# Patient Record
Sex: FEMALE | Race: WHITE | NOT HISPANIC OR LATINO | Employment: PART TIME | URBAN - METROPOLITAN AREA
[De-identification: names, ages, dates, MRNs, and addresses within clinical notes are randomized per-mention and may not be internally consistent; named-entity substitution may affect disease eponyms.]

---

## 2022-10-20 ENCOUNTER — OFFICE VISIT (OUTPATIENT)
Dept: OBGYN CLINIC | Facility: CLINIC | Age: 22
End: 2022-10-20

## 2022-10-20 VITALS
WEIGHT: 151 LBS | HEIGHT: 63 IN | SYSTOLIC BLOOD PRESSURE: 112 MMHG | DIASTOLIC BLOOD PRESSURE: 78 MMHG | BODY MASS INDEX: 26.75 KG/M2

## 2022-10-20 DIAGNOSIS — Z01.419 GYNECOLOGIC EXAM NORMAL: Primary | ICD-10-CM

## 2022-10-20 DIAGNOSIS — N94.6 DYSMENORRHEA: ICD-10-CM

## 2022-10-20 DIAGNOSIS — Z11.3 SCREENING EXAMINATION FOR VENEREAL DISEASE: ICD-10-CM

## 2022-10-20 RX ORDER — NORETHINDRONE ACETATE AND ETHINYL ESTRADIOL 1MG-20(21)
1 KIT ORAL DAILY
Qty: 28 TABLET | Refills: 2 | Status: SHIPPED | OUTPATIENT
Start: 2022-10-20

## 2022-10-20 NOTE — PROGRESS NOTES
Assessment/Plan   Problem List Items Addressed This Visit        Other    Gynecologic exam normal - Primary     Pap guidelines reviewed  Will plan to start pap smears at age 25  STD cultures done today  Reviewed birth control options including OCP, NuvaRing, Patch, Depo, Nexplanon  Patient decided on OCP  Reviewed when to start, what to do if misses pill  Recommend using condoms for the 1st month on the pill, if misses more than 2 pills in the pack, if on antibiotics and for STD prevention  Reviewed common side effects of the pill including nausea, vomiting, breast pain, bloating, fatigue, mood swings, weight gain, and increased acne  Reassured side effects typically diminish in the first month or two on the pill  Reviewed clotting risk and signs and symptoms of pulmonary embolism, DVT, myocardial infarction, stroke  Will plan to trial Junel Fe 1/20  Return to office in 3 months for pill check  Other Visit Diagnoses     Dysmenorrhea        Relevant Medications    norethindrone-ethinyl estradiol (Junel FE 1/20) 1-20 MG-MCG per tablet    Screening examination for venereal disease        Relevant Orders    Chlamydia/GC amplified DNA by PCR          Subjective:     Patient ID: Keesha Aguilera is a 22 y o  y o  female  HPI  23 yo seen for annual exam    Menses monthly, last 4 days and then 2 days of spotting  Cramping mostly in lower back better with heating pad and advil  Using tampon during the day changing every 4 hours or so  Sexually active, monogamous x 2 years  Has had previous partners, has never had STD testing  Denies bowel or bladder issues  Would like to discuss birth control options  Last pap: N/A  The following portions of the patient's history were reviewed and updated as appropriate:   She  has no past medical history on file    She   Patient Active Problem List    Diagnosis Date Noted   • Gynecologic exam normal 10/20/2022   • Cleft palate, unspecified 08/10/2011   • Simple chronic serous otitis media 2006     She  has no past surgical history on file  Her family history includes Breast cancer in her mother; Heart disease in her father; Hypertension in her father; Tabby Dieter / [place] in her mother  She  reports that she has never smoked  She has never used smokeless tobacco  She reports previous alcohol use  She reports that she does not use drugs  Current Outpatient Medications   Medication Sig Dispense Refill   • norethindrone-ethinyl estradiol (Junel ) 1-20 MG-MCG per tablet Take 1 tablet by mouth daily 28 tablet 2     No current facility-administered medications for this visit  She has No Known Allergies       Menstrual History:  OB History        0    Para   0    Term   0       0    AB   0    Living   0       SAB   0    IAB   0    Ectopic   0    Multiple   0    Live Births   0                  Patient's last menstrual period was 2022 (approximate)  Review of Systems   Constitutional: Negative for fatigue, fever and unexpected weight change  HENT: Negative for dental problem and sinus pressure  Eyes: Negative for visual disturbance  Respiratory: Negative for cough, shortness of breath and wheezing  Cardiovascular: Negative for chest pain  Gastrointestinal: Negative for abdominal pain, blood in stool, constipation, diarrhea, nausea and vomiting  Endocrine: Negative for polydipsia  Genitourinary: Negative for difficulty urinating, dyspareunia, dysuria, frequency, hematuria, pelvic pain and urgency  Musculoskeletal: Negative for arthralgias and back pain  Neurological: Negative for dizziness, seizures, light-headedness and headaches  Psychiatric/Behavioral: Negative for suicidal ideas  The patient is not nervous/anxious          Objective:  Vitals:    10/20/22 1327   BP: 112/78   BP Location: Right arm   Patient Position: Sitting   Cuff Size: Standard   Weight: 68 5 kg (151 lb)   Height: 5' 3" (1 6 m)      Physical Exam  Constitutional:       Appearance: Normal appearance  She is well-developed  Genitourinary:      Vulva and bladder normal       No lesions in the vagina  Right Labia: No rash, tenderness, lesions or skin changes  Left Labia: No tenderness, lesions, skin changes or rash  No labial fusion noted  No inguinal adenopathy present in the right or left side  No vaginal discharge, erythema, tenderness or bleeding  Right Adnexa: not tender, not full and no mass present  Left Adnexa: not tender, not full and no mass present  No cervical motion tenderness, discharge or lesion  Uterus is not enlarged, tender or irregular  No uterine mass detected  No urethral prolapse, tenderness or mass present  Bladder is not tender  Breasts: Breasts are symmetrical       Right: No swelling, bleeding, inverted nipple, mass, nipple discharge, skin change, tenderness, axillary adenopathy or supraclavicular adenopathy  Left: No swelling, bleeding, inverted nipple, mass, nipple discharge, skin change, tenderness, axillary adenopathy or supraclavicular adenopathy  HENT:      Head: Normocephalic and atraumatic  Neck:      Thyroid: No thyromegaly  Cardiovascular:      Rate and Rhythm: Normal rate and regular rhythm  Heart sounds: Normal heart sounds  No murmur heard  No friction rub  No gallop  Pulmonary:      Effort: Pulmonary effort is normal  No respiratory distress  Breath sounds: Normal breath sounds  No wheezing  Abdominal:      General: There is no distension  Palpations: Abdomen is soft  There is no mass  Tenderness: There is no abdominal tenderness  There is no guarding or rebound  Hernia: No hernia is present  Lymphadenopathy:      Cervical: No cervical adenopathy  Upper Body:      Right upper body: No supraclavicular, axillary or pectoral adenopathy        Left upper body: No supraclavicular, axillary or pectoral adenopathy  Lower Body: No right inguinal adenopathy  No left inguinal adenopathy  Neurological:      Mental Status: She is alert and oriented to person, place, and time  Skin:     General: Skin is warm and dry     Psychiatric:         Behavior: Behavior normal

## 2022-10-20 NOTE — ASSESSMENT & PLAN NOTE
Pap guidelines reviewed  Will plan to start pap smears at age 25  STD cultures done today  Reviewed birth control options including OCP, NuvaRing, Patch, Depo, Nexplanon  Patient decided on OCP  Reviewed when to start, what to do if misses pill  Recommend using condoms for the 1st month on the pill, if misses more than 2 pills in the pack, if on antibiotics and for STD prevention  Reviewed common side effects of the pill including nausea, vomiting, breast pain, bloating, fatigue, mood swings, weight gain, and increased acne  Reassured side effects typically diminish in the first month or two on the pill  Reviewed clotting risk and signs and symptoms of pulmonary embolism, DVT, myocardial infarction, stroke  Will plan to trial Junel Fe 1/20  Return to office in 3 months for pill check

## 2022-10-22 LAB
C TRACH RRNA SPEC QL NAA+PROBE: NEGATIVE
N GONORRHOEA RRNA SPEC QL NAA+PROBE: NEGATIVE

## 2022-11-09 DIAGNOSIS — N94.6 DYSMENORRHEA: ICD-10-CM

## 2022-11-09 RX ORDER — NORETHINDRONE ACETATE AND ETHINYL ESTRADIOL AND FERROUS FUMARATE 1MG-20(21)
KIT ORAL
Qty: 84 TABLET | Refills: 1 | Status: SHIPPED | OUTPATIENT
Start: 2022-11-09

## 2022-11-18 ENCOUNTER — TELEPHONE (OUTPATIENT)
Dept: OBGYN CLINIC | Facility: CLINIC | Age: 22
End: 2022-11-18

## 2022-11-18 NOTE — TELEPHONE ENCOUNTER
Patient called stating that she was notified by her local [facility] in [address] [place] [address] that her insurance no longer cover that pharmacy and she must go through a home delivery pharmacy  She was not sure how to proceed with the process and asking for help  Advised her to first contact her insurance to determine what home delivery pharmacy they cover so therefore we can know to update her chart and can sent the additional refills to the pharmacy  She states she only have 2 pills left, informed while on the phone with her insurance, she can ask them if they can override one time refill at her local pharmacy until the rest of her script delivers, or she may pay out of pocket  She voiced appreciation

## 2022-12-19 PROBLEM — Z01.419 GYNECOLOGIC EXAM NORMAL: Status: RESOLVED | Noted: 2022-10-20 | Resolved: 2022-12-19

## 2023-01-05 ENCOUNTER — OFFICE VISIT (OUTPATIENT)
Dept: OBGYN CLINIC | Facility: CLINIC | Age: 23
End: 2023-01-05

## 2023-01-05 VITALS
DIASTOLIC BLOOD PRESSURE: 70 MMHG | BODY MASS INDEX: 27.29 KG/M2 | HEIGHT: 63 IN | SYSTOLIC BLOOD PRESSURE: 115 MMHG | WEIGHT: 154 LBS

## 2023-01-05 DIAGNOSIS — R63.5 WEIGHT GAIN: ICD-10-CM

## 2023-01-05 DIAGNOSIS — N92.6 IRREGULAR MENSES: Primary | ICD-10-CM

## 2023-01-05 DIAGNOSIS — R35.0 URINARY FREQUENCY: ICD-10-CM

## 2023-01-05 DIAGNOSIS — R63.1 EXCESSIVE THIRST: ICD-10-CM

## 2023-01-05 RX ORDER — NORETHINDRONE ACETATE AND ETHINYL ESTRADIOL 1.5-30(21)
1 KIT ORAL DAILY
Qty: 90 TABLET | Refills: 1 | Status: SHIPPED | OUTPATIENT
Start: 2023-01-05

## 2023-01-05 NOTE — PROGRESS NOTES
Assessment/Plan:    Excessive thirst  Reviewed excessive thirst and urinary frequency  Seems to be in the last 3 months since starting OCP  Reviewed not a typical side effect of birth control  Recommend labs to further evaluate  Script given  Aware to follow up with PCP as well  Weight gain  Reviewed persistent weight gain without dietary or exercise modifications  Will plan labs to further evaluate  If normal recommend follow up with PCP to evaluate further  Irregular menses  Reviewed irregular menses with patient on OCP  Reviewed missing or being late on pills can cause breakthrough bleeding  Patient reports has been good about taking the last two months and still getting breakthrough  Offered to switch to a longer term method of birth control vs changing to a higher dose birth control pill to help decrease breakthrough bleeding  Patient would like to increase dose  Script for [facility] [facility] 1 5/30 sent to pharmacy  Will return to office in 3 months for pill check  Problem List Items Addressed This Visit        Other    Irregular menses - Primary     Reviewed irregular menses with patient on OCP  Reviewed missing or being late on pills can cause breakthrough bleeding  Patient reports has been good about taking the last two months and still getting breakthrough  Offered to switch to a longer term method of birth control vs changing to a higher dose birth control pill to help decrease breakthrough bleeding  Patient would like to increase dose  Script for [place] [facility] 1 5/30 sent to pharmacy  Will return to office in 3 months for pill check  Relevant Medications    norethindrone-ethinyl estradiol-iron (Junel FE 1 5/30) 1 5-30 MG-MCG tablet    Other Relevant Orders    CBC and differential    Weight gain     Reviewed persistent weight gain without dietary or exercise modifications  Will plan labs to further evaluate  If normal recommend follow up with PCP to evaluate further            Relevant Orders    Comprehensive metabolic panel    HEMOGLOBIN A1C W/ EAG ESTIMATION    TSH, 3rd generation    T4, free    Excessive thirst     Reviewed excessive thirst and urinary frequency  Seems to be in the last 3 months since starting OCP  Reviewed not a typical side effect of birth control  Recommend labs to further evaluate  Script given  Aware to follow up with PCP as well  Relevant Orders    Comprehensive metabolic panel    HEMOGLOBIN A1C W/ EAG ESTIMATION   Other Visit Diagnoses     Urinary frequency        Relevant Orders    Comprehensive metabolic panel    UA/M w/rflx Culture, Routine            Subjective:      Patient ID: Ramirez Morrison is a 25 y o  female  HPI  26 yo seen for pill check  Was started on Junel Fe 1/20 3 months ago  Reports 1st month had breakthrough spotting  2nd month had full menses in the middle and then got menses again during placebo week  Reports some spotting this pack as well  Reports forgotten pill only 1-2 x's over the last 3 months  Reports increased thirst, and increased frequency of urination  Had a UTI for the first time ever in the first month on the pill  Denies any current hesitancy, dysuria, urgency, hematuria, fever, chills, flank pain  Reports noticed since the Spring and increase in weight about 20+ lbs with no changes in diet or exercise  Since starting the pill has gained about 3 lbs per record  Patient is tearful, frustrated with weight gain  The following portions of the patient's history were reviewed and updated as appropriate: She  has no past medical history on file  She   Patient Active Problem List    Diagnosis Date Noted   • Irregular menses 01/05/2023   • Weight gain 01/05/2023   • Excessive thirst 01/05/2023   • Cleft palate, unspecified 08/10/2011   • Simple chronic serous otitis media 08/23/2006     She  has no past surgical history on file    Her family history includes Breast cancer in her mother; Heart disease in her father; Hypertension in her father; Sonal Reynolds / [place] in her mother  She  reports that she has never smoked  She has never used smokeless tobacco  She reports that she does not currently use alcohol  She reports that she does not use drugs  Current Outpatient Medications   Medication Sig Dispense Refill   • norethindrone-ethinyl estradiol-iron (Junel FE 1 5/30) 1 5-30 MG-MCG tablet Take 1 tablet by mouth daily 90 tablet 1     No current facility-administered medications for this visit  She has No Known Allergies       Review of Systems   Constitutional: Positive for unexpected weight change  Negative for fatigue and fever  HENT: Negative for dental problem and sinus pressure  Eyes: Negative for visual disturbance  Respiratory: Negative for cough, shortness of breath and wheezing  Cardiovascular: Negative for chest pain  Gastrointestinal: Negative for abdominal pain, blood in stool, constipation, diarrhea, nausea and vomiting  Endocrine: Positive for polydipsia  Genitourinary: Positive for frequency and menstrual problem  Negative for difficulty urinating, dyspareunia, dysuria, hematuria, pelvic pain and urgency  Musculoskeletal: Negative for arthralgias and back pain  Neurological: Negative for dizziness, seizures, light-headedness and headaches  Psychiatric/Behavioral: Negative for suicidal ideas  The patient is not nervous/anxious  Objective:      /70   Ht 5' 3" (1 6 m)   Wt 69 9 kg (154 lb)   LMP 12/18/2022 (Approximate)   BMI 27 28 kg/m²          Physical Exam  Constitutional:       Appearance: She is well-developed  Neck:      Thyroid: No thyromegaly  Cardiovascular:      Rate and Rhythm: Normal rate and regular rhythm  Heart sounds: Normal heart sounds  No murmur heard  No friction rub  No gallop  Pulmonary:      Effort: Pulmonary effort is normal  No respiratory distress  Breath sounds: Normal breath sounds  No wheezing or rales     Chest: Chest wall: No tenderness  Abdominal:      General: Abdomen is flat  Bowel sounds are normal  There is no distension  Palpations: Abdomen is soft  There is no mass  Tenderness: There is no abdominal tenderness  There is no guarding or rebound  Hernia: No hernia is present  Skin:     General: Skin is warm and dry  Neurological:      Mental Status: She is alert and oriented to person, place, and time     Psychiatric:         Behavior: Behavior normal

## 2023-01-05 NOTE — ASSESSMENT & PLAN NOTE
Reviewed excessive thirst and urinary frequency  Seems to be in the last 3 months since starting OCP  Reviewed not a typical side effect of birth control  Recommend labs to further evaluate  Script given  Aware to follow up with PCP as well

## 2023-01-05 NOTE — ASSESSMENT & PLAN NOTE
Reviewed persistent weight gain without dietary or exercise modifications  Will plan labs to further evaluate  If normal recommend follow up with PCP to evaluate further

## 2023-01-05 NOTE — ASSESSMENT & PLAN NOTE
Reviewed irregular menses with patient on OCP  Reviewed missing or being late on pills can cause breakthrough bleeding  Patient reports has been good about taking the last two months and still getting breakthrough  Offered to switch to a longer term method of birth control vs changing to a higher dose birth control pill to help decrease breakthrough bleeding  Patient would like to increase dose  Script for [facility] 1 5/30 sent to pharmacy  Will return to office in 3 months for pill check

## 2023-01-07 LAB
ALBUMIN SERPL-MCNC: 4.3 G/DL (ref 3.9–5)
ALBUMIN/GLOB SERPL: 2 {RATIO} (ref 1.2–2.2)
ALP SERPL-CCNC: 47 IU/L (ref 44–121)
ALT SERPL-CCNC: 8 IU/L (ref 0–32)
AST SERPL-CCNC: 13 IU/L (ref 0–40)
BASOPHILS # BLD AUTO: 0 X10E3/UL (ref 0–0.2)
BASOPHILS NFR BLD AUTO: 1 %
BILIRUB SERPL-MCNC: <0.2 MG/DL (ref 0–1.2)
BUN SERPL-MCNC: 12 MG/DL (ref 6–20)
BUN/CREAT SERPL: 18 (ref 9–23)
CALCIUM SERPL-MCNC: 9.5 MG/DL (ref 8.7–10.2)
CHLORIDE SERPL-SCNC: 104 MMOL/L (ref 96–106)
CO2 SERPL-SCNC: 23 MMOL/L (ref 20–29)
CREAT SERPL-MCNC: 0.65 MG/DL (ref 0.57–1)
EGFR: 128 ML/MIN/1.73
EOSINOPHIL # BLD AUTO: 0.2 X10E3/UL (ref 0–0.4)
EOSINOPHIL NFR BLD AUTO: 3 %
ERYTHROCYTE [DISTWIDTH] IN BLOOD BY AUTOMATED COUNT: 13.4 % (ref 11.7–15.4)
EST. AVERAGE GLUCOSE BLD GHB EST-MCNC: 111 MG/DL
GLOBULIN SER-MCNC: 2.2 G/DL (ref 1.5–4.5)
GLUCOSE SERPL-MCNC: 88 MG/DL (ref 70–99)
HBA1C MFR BLD: 5.5 % (ref 4.8–5.6)
HCT VFR BLD AUTO: 37 % (ref 34–46.6)
HGB BLD-MCNC: 12.3 G/DL (ref 11.1–15.9)
IMM GRANULOCYTES # BLD: 0 X10E3/UL (ref 0–0.1)
IMM GRANULOCYTES NFR BLD: 0 %
LYMPHOCYTES # BLD AUTO: 2.1 X10E3/UL (ref 0.7–3.1)
LYMPHOCYTES NFR BLD AUTO: 41 %
MCH RBC QN AUTO: 28 PG (ref 26.6–33)
MCHC RBC AUTO-ENTMCNC: 33.2 G/DL (ref 31.5–35.7)
MCV RBC AUTO: 84 FL (ref 79–97)
MONOCYTES # BLD AUTO: 0.4 X10E3/UL (ref 0.1–0.9)
MONOCYTES NFR BLD AUTO: 7 %
NEUTROPHILS # BLD AUTO: 2.5 X10E3/UL (ref 1.4–7)
NEUTROPHILS NFR BLD AUTO: 48 %
PLATELET # BLD AUTO: 232 X10E3/UL (ref 150–450)
POTASSIUM SERPL-SCNC: 4.1 MMOL/L (ref 3.5–5.2)
PROT SERPL-MCNC: 6.5 G/DL (ref 6–8.5)
RBC # BLD AUTO: 4.39 X10E6/UL (ref 3.77–5.28)
SODIUM SERPL-SCNC: 139 MMOL/L (ref 134–144)
T4 FREE SERPL-MCNC: 1.04 NG/DL (ref 0.82–1.77)
TSH SERPL DL<=0.005 MIU/L-ACNC: 2.85 UIU/ML (ref 0.45–4.5)
WBC # BLD AUTO: 5.2 X10E3/UL (ref 3.4–10.8)

## 2023-12-28 ENCOUNTER — ANNUAL EXAM (OUTPATIENT)
Dept: OBGYN CLINIC | Facility: CLINIC | Age: 23
End: 2023-12-28
Payer: COMMERCIAL

## 2023-12-28 VITALS
HEIGHT: 63 IN | DIASTOLIC BLOOD PRESSURE: 62 MMHG | WEIGHT: 145 LBS | BODY MASS INDEX: 25.69 KG/M2 | SYSTOLIC BLOOD PRESSURE: 104 MMHG

## 2023-12-28 DIAGNOSIS — Z11.3 SCREENING EXAMINATION FOR VENEREAL DISEASE: ICD-10-CM

## 2023-12-28 DIAGNOSIS — Z01.419 GYNECOLOGIC EXAM NORMAL: Primary | ICD-10-CM

## 2023-12-28 PROCEDURE — S0612 ANNUAL GYNECOLOGICAL EXAMINA: HCPCS | Performed by: PHYSICIAN ASSISTANT

## 2023-12-28 NOTE — PROGRESS NOTES
Assessment/Plan   Problem List Items Addressed This Visit          Other    Gynecologic exam normal - Primary     Pap guidelines reviewed. Pap and STD cultures done today.   Reviewed longer term birth control methods. Most interested in Nexplanon. Reviewed insertion, removal, common bleeding patterns and side effects. Reviewed risks of damage to nerves, blood vessels during insertion or removal. Will plan to check insurance for coverage and schedule at earliest available.              Relevant Orders    IGP, CtNg, rfx Aptima HPV ASCU     Other Visit Diagnoses       Screening examination for venereal disease        Relevant Orders    IGP, CtNg, rfx Aptima HPV ASCU            Subjective:     Patient ID: Haritha Guerrier is a 23 y.o. y.o. female.    HPI  22 yo seen for annual exam. Previously on Junel Fe 1.5/30 reports not good at taking would like to discuss longer term options.   Denies bowel or bladder issues.   Not currently sexually active, has been in the past. Would like STD cultures.   Last pap: none on file.   The following portions of the patient's history were reviewed and updated as appropriate: She  has no past medical history on file.  She   Patient Active Problem List    Diagnosis Date Noted    Gynecologic exam normal 12/28/2023    Irregular menses 01/05/2023    Weight gain 01/05/2023    Excessive thirst 01/05/2023    Cleft palate, unspecified 08/10/2011    Simple chronic serous otitis media 08/23/2006     She  has no past surgical history on file.  Her family history includes Breast cancer in her mother; Heart disease in her father; Hypertension in her father; Miscarriages / Stillbirths in her mother.  She  reports that she has never smoked. She has never used smokeless tobacco. She reports that she does not currently use alcohol. She reports that she does not use drugs.  No current outpatient medications on file.     No current facility-administered medications for this visit.     She has No Known  "Allergies..    Menstrual History:  OB History          0    Para   0    Term   0       0    AB   0    Living   0         SAB   0    IAB   0    Ectopic   0    Multiple   0    Live Births   0                  Patient's last menstrual period was 2023.         Review of Systems   Constitutional:  Negative for fatigue, fever and unexpected weight change.   HENT:  Negative for dental problem and sinus pressure.    Eyes:  Negative for visual disturbance.   Respiratory:  Negative for cough, shortness of breath and wheezing.    Cardiovascular:  Negative for chest pain.   Gastrointestinal:  Negative for abdominal pain, blood in stool, constipation, diarrhea, nausea and vomiting.   Endocrine: Negative for polydipsia.   Genitourinary:  Negative for difficulty urinating, dyspareunia, dysuria, frequency, hematuria, pelvic pain and urgency.   Musculoskeletal:  Negative for arthralgias and back pain.   Neurological:  Negative for dizziness, seizures, light-headedness and headaches.   Psychiatric/Behavioral:  Negative for suicidal ideas. The patient is not nervous/anxious.        Objective:  Vitals:    23 1050   BP: 104/62   BP Location: Right arm   Patient Position: Sitting   Cuff Size: Standard   Weight: 65.8 kg (145 lb)   Height: 5' 3\" (1.6 m)      Physical Exam  Constitutional:       Appearance: Normal appearance. She is well-developed.   Genitourinary:      Vulva and bladder normal.      No lesions in the vagina.      Right Labia: No rash, tenderness, lesions or skin changes.     Left Labia: No tenderness, lesions, skin changes or rash.     No labial fusion noted.      No inguinal adenopathy present in the right or left side.     No vaginal discharge, erythema, tenderness or bleeding.        Right Adnexa: not tender, not full and no mass present.     Left Adnexa: not tender, not full and no mass present.     No cervical motion tenderness, discharge or lesion.      Uterus is not enlarged, tender or " irregular.      No uterine mass detected.     No urethral prolapse, tenderness or mass present.      Bladder is not tender.    Breasts:     Breasts are symmetrical.      Right: No swelling, bleeding, inverted nipple, mass, nipple discharge, skin change or tenderness.      Left: No swelling, bleeding, inverted nipple, mass, nipple discharge, skin change or tenderness.   HENT:      Head: Normocephalic and atraumatic.   Neck:      Thyroid: No thyromegaly.   Cardiovascular:      Rate and Rhythm: Normal rate and regular rhythm.      Heart sounds: Normal heart sounds. No murmur heard.     No friction rub. No gallop.   Pulmonary:      Effort: Pulmonary effort is normal. No respiratory distress.      Breath sounds: Normal breath sounds. No wheezing.   Abdominal:      General: There is no distension.      Palpations: Abdomen is soft. There is no mass.      Tenderness: There is no abdominal tenderness. There is no guarding or rebound.      Hernia: No hernia is present.   Lymphadenopathy:      Cervical: No cervical adenopathy.      Upper Body:      Right upper body: No supraclavicular, axillary or pectoral adenopathy.      Left upper body: No supraclavicular, axillary or pectoral adenopathy.      Lower Body: No right inguinal adenopathy. No left inguinal adenopathy.   Neurological:      Mental Status: She is alert and oriented to person, place, and time.   Skin:     General: Skin is warm and dry.   Psychiatric:         Behavior: Behavior normal.

## 2023-12-28 NOTE — ASSESSMENT & PLAN NOTE
Pap guidelines reviewed. Pap and STD cultures done today.   Reviewed longer term birth control methods. Most interested in Nexplanon. Reviewed insertion, removal, common bleeding patterns and side effects. Reviewed risks of damage to nerves, blood vessels during insertion or removal. Will plan to check insurance for coverage and schedule at earliest available.

## 2024-01-02 LAB
C TRACH RRNA CVX QL NAA+PROBE: NEGATIVE
CYTOLOGIST CVX/VAG CYTO: NORMAL
DX ICD CODE: NORMAL
N GONORRHOEA RRNA CVX QL NAA+PROBE: NEGATIVE
OTHER STN SPEC: NORMAL
OTHER STN SPEC: NORMAL
PATH REPORT.FINAL DX SPEC: NORMAL
SL AMB NOTE:: NORMAL
SL AMB SPECIMEN ADEQUACY: NORMAL
SL AMB TEST METHODOLOGY: NORMAL

## 2024-01-03 ENCOUNTER — PROCEDURE VISIT (OUTPATIENT)
Dept: OBGYN CLINIC | Facility: CLINIC | Age: 24
End: 2024-01-03
Payer: COMMERCIAL

## 2024-01-03 VITALS
SYSTOLIC BLOOD PRESSURE: 100 MMHG | HEIGHT: 63 IN | BODY MASS INDEX: 26.22 KG/M2 | DIASTOLIC BLOOD PRESSURE: 64 MMHG | WEIGHT: 148 LBS

## 2024-01-03 DIAGNOSIS — Z32.02 ENCOUNTER FOR PREGNANCY TEST, RESULT NEGATIVE: ICD-10-CM

## 2024-01-03 DIAGNOSIS — Z30.017 NEXPLANON INSERTION: Primary | ICD-10-CM

## 2024-01-03 LAB — SL AMB POCT URINE HCG: NEGATIVE

## 2024-01-03 PROCEDURE — 81025 URINE PREGNANCY TEST: CPT | Performed by: PHYSICIAN ASSISTANT

## 2024-01-03 PROCEDURE — 11981 INSERTION DRUG DLVR IMPLANT: CPT | Performed by: PHYSICIAN ASSISTANT

## 2024-01-03 NOTE — ASSESSMENT & PLAN NOTE
Reviewed insertion, removal, common bleeding patterns and side effects. Reviewed risks of damage to nerves, blood vessels during insertion or removal.   Nexplanon inserted without difficulty. Patient tolerated procedure well.   Avoiding lifting with left arm or getting wet for 24 hours. Leave bandage on for 24 hours and steri-strips for 1 week.   Call office if having any issues.

## 2024-01-03 NOTE — PROGRESS NOTES
Assessment/Plan:    Nexplanon insertion  Reviewed insertion, removal, common bleeding patterns and side effects. Reviewed risks of damage to nerves, blood vessels during insertion or removal.   Nexplanon inserted without difficulty. Patient tolerated procedure well.   Avoiding lifting with left arm or getting wet for 24 hours. Leave bandage on for 24 hours and steri-strips for 1 week.   Call office if having any issues.           Problem List Items Addressed This Visit          Other    Nexplanon insertion - Primary     Reviewed insertion, removal, common bleeding patterns and side effects. Reviewed risks of damage to nerves, blood vessels during insertion or removal.   Nexplanon inserted without difficulty. Patient tolerated procedure well.   Avoiding lifting with left arm or getting wet for 24 hours. Leave bandage on for 24 hours and steri-strips for 1 week.   Call office if having any issues.                Subjective:      Patient ID: Haritha Guerrier is a 23 y.o. female.    HPI  22 yo seen for Nexplanon insertion. LMP: 12/16/2023. Not sexually active. Negative UPT in office today.   The following portions of the patient's history were reviewed and updated as appropriate: She  has no past medical history on file.  She   Patient Active Problem List    Diagnosis Date Noted    Nexplanon insertion 01/03/2024    Gynecologic exam normal 12/28/2023    Irregular menses 01/05/2023    Weight gain 01/05/2023    Excessive thirst 01/05/2023    Cleft palate, unspecified 08/10/2011    Simple chronic serous otitis media 08/23/2006     She  has no past surgical history on file.  Her family history includes Breast cancer in her mother; Heart disease in her father; Hypertension in her father; Miscarriages / Stillbirths in her mother.  She  reports that she has never smoked. She has never used smokeless tobacco. She reports that she does not currently use alcohol. She reports that she does not use drugs.  No current outpatient  "medications on file.     No current facility-administered medications for this visit.     She has No Known Allergies..    Review of Systems   Constitutional:  Negative for fatigue, fever and unexpected weight change.   HENT:  Negative for dental problem and sinus pressure.    Eyes:  Negative for visual disturbance.   Respiratory:  Negative for cough, shortness of breath and wheezing.    Cardiovascular:  Negative for chest pain.   Gastrointestinal:  Negative for abdominal pain, blood in stool, constipation, diarrhea, nausea and vomiting.   Endocrine: Negative for polydipsia.   Genitourinary:  Negative for difficulty urinating, dyspareunia, dysuria, frequency, hematuria, pelvic pain and urgency.   Musculoskeletal:  Negative for arthralgias and back pain.   Neurological:  Negative for dizziness, seizures, light-headedness and headaches.   Psychiatric/Behavioral:  Negative for suicidal ideas. The patient is not nervous/anxious.          Objective:      /64 (BP Location: Left arm, Patient Position: Sitting, Cuff Size: Standard)   Ht 5' 3\" (1.6 m)   Wt 67.1 kg (148 lb)   LMP 12/16/2023 (Approximate)   Breastfeeding No   BMI 26.22 kg/m²          Physical Exam  Constitutional:       Appearance: She is well-developed.   HENT:      Head: Normocephalic and atraumatic.   Pulmonary:      Effort: Pulmonary effort is normal.   Skin:     General: Skin is warm and dry.   Neurological:      Mental Status: She is alert and oriented to person, place, and time.       Universal Protocol:  Consent: Verbal consent obtained. Written consent not obtained.  Risks and benefits: risks, benefits and alternatives were discussed  Consent given by: patient  Time out: Immediately prior to procedure a \"time out\" was called to verify the correct patient, procedure, equipment, support staff and site/side marked as required.  Timeout called at: 1/3/2024 9:26 AM.  Patient understanding: patient states understanding of the procedure being " performed  Patient consent: the patient's understanding of the procedure matches consent given  Procedure consent: procedure consent matches procedure scheduled  Relevant documents: relevant documents present and verified  Test results: test results available and properly labeled  Site marked: the operative site was marked  Required items: required blood products, implants, devices, and special equipment available  Patient identity confirmed: verbally with patient  Remove and insert drug implant    Date/Time: 1/3/2024 9:20 AM    Performed by: Twila Ramirez PA-C  Authorized by: Jessica Guardado MD    Indication:     Indication: Insertion of non-biodegradable drug delivery implant    Pre-procedure:     Pre-procedure timeout performed: yes      Prepped with: povidone-iodine      Local anesthetic:  Xylocaine with epinephrine    The site was cleaned and prepped in a sterile fashion: yes    Procedure:     Procedure:  Insertion    Small stab incision was made in arm: yes      Left/right:  Left    Preloaded contraceptive capsule trocar was placed subdermally: yes      Visualization of implant was obtained: yes      Contraceptive capsule was inserted and trocar removed: yes      Visualization of notch in stylet and palpation of device: yes      Palpation confirms placement by provider and patient: yes      Site was closed with steri-strips and pressure bandage applied: yes

## 2024-02-21 PROBLEM — Z01.419 GYNECOLOGIC EXAM NORMAL: Status: RESOLVED | Noted: 2023-12-28 | Resolved: 2024-02-21

## 2024-08-12 ENCOUNTER — PATIENT MESSAGE (OUTPATIENT)
Dept: OBGYN CLINIC | Facility: CLINIC | Age: 24
End: 2024-08-12

## 2024-08-12 DIAGNOSIS — N92.1 BREAKTHROUGH BLEEDING ON NEXPLANON: Primary | ICD-10-CM

## 2024-08-12 DIAGNOSIS — Z97.5 BREAKTHROUGH BLEEDING ON NEXPLANON: Primary | ICD-10-CM

## 2024-08-13 DIAGNOSIS — N92.1 BREAKTHROUGH BLEEDING ON NEXPLANON: Primary | ICD-10-CM

## 2024-08-13 DIAGNOSIS — Z97.5 BREAKTHROUGH BLEEDING ON NEXPLANON: Primary | ICD-10-CM

## 2024-08-13 RX ORDER — NORETHINDRONE ACETATE AND ETHINYL ESTRADIOL 1MG-20(21)
1 KIT ORAL DAILY
Qty: 28 TABLET | Refills: 2 | Status: SHIPPED | OUTPATIENT
Start: 2024-08-13

## 2024-08-13 RX ORDER — NORETHINDRONE ACETATE AND ETHINYL ESTRADIOL 1MG-20(21)
1 KIT ORAL DAILY
Qty: 28 TABLET | Refills: 2 | Status: SHIPPED | OUTPATIENT
Start: 2024-08-13 | End: 2024-08-13

## 2025-02-20 ENCOUNTER — ANNUAL EXAM (OUTPATIENT)
Dept: OBGYN CLINIC | Facility: CLINIC | Age: 25
End: 2025-02-20
Payer: COMMERCIAL

## 2025-02-20 VITALS
DIASTOLIC BLOOD PRESSURE: 70 MMHG | WEIGHT: 152 LBS | SYSTOLIC BLOOD PRESSURE: 106 MMHG | BODY MASS INDEX: 26.93 KG/M2 | HEIGHT: 63 IN

## 2025-02-20 DIAGNOSIS — Z01.419 ROUTINE GYNECOLOGICAL EXAMINATION: Primary | ICD-10-CM

## 2025-02-20 DIAGNOSIS — Z11.3 SCREENING EXAMINATION FOR VENEREAL DISEASE: ICD-10-CM

## 2025-02-20 PROCEDURE — S0612 ANNUAL GYNECOLOGICAL EXAMINA: HCPCS | Performed by: PHYSICIAN ASSISTANT

## 2025-02-20 RX ORDER — ETONOGESTREL 68 MG/1
68 IMPLANT SUBCUTANEOUS
COMMUNITY

## 2025-02-20 NOTE — ASSESSMENT & PLAN NOTE
Pap guidelines reviewed. Pap with reflex and STD cultures done today.   Continue Nexplanon.   Return to office for annual or as needed.

## 2025-02-20 NOTE — PROGRESS NOTES
[facility] [address], [address], [place] [address]    ASSESSMENT/PLAN: Haritha Guerrier is a 24 y.o.  who presents for annual gynecologic exam.    Encounter for routine gynecologic examination  - Routine well woman exam completed today.  - Cervical Cancer Screening: Current ASCCP Guidelines reviewed. Last Pap: 2023 NILM. History of abnormal: no  - HPV Vaccination status: Immunization series complete  - STI screening offered including HIV testing: GC/CT done, others declined  - Contraceptive counseling discussed.  Current contraception: Nexplanon:     Additional problems addressed during this visit:  1. Routine gynecological examination  Assessment & Plan:  Pap guidelines reviewed. Pap with reflex and STD cultures done today.   Continue Nexplanon.   Return to office for annual or as needed.   Orders:  -     IGP, CtNg, rfx Aptima HPV ASCU  2. Screening examination for venereal disease  -     IGP, CtNg, rfx Aptima HPV ASCU      CC:  Annual Gynecologic Examination    HPI: Haritha Guerrier is a 24 y.o.  who presents for annual gynecologic examination. Patient has Nexplanon inserted 1/3/2024. Reports irregular cycles not prolonged and no spotting in between. Tolerates well. Denies bowel or bladder issues.     ROS: Negative except as noted in HPI    No LMP recorded. Patient has had an implant.       She  reports being sexually active and has had partner(s) who are male. She reports using the following method of birth control/protection: Condom Male.       The following portions of the patient's history were reviewed and updated as appropriate:   History reviewed. No pertinent past medical history.  History reviewed. No pertinent surgical history.  Family History   Problem Relation Age of Onset    Breast cancer Mother     Miscarriages / Stillbirths Mother     Heart disease Father     Hypertension Father      Social History     Socioeconomic History    Marital status: Single  "    Spouse name: None    Number of children: None    Years of education: None    Highest education level: None   Occupational History    None   Tobacco Use    Smoking status: Never    Smokeless tobacco: Never   Vaping Use    Vaping status: Never Used   Substance and Sexual Activity    Alcohol use: Not Currently    Drug use: Never    Sexual activity: Yes     Partners: Male     Birth control/protection: Condom Male   Other Topics Concern    None   Social History Narrative    None     Social Drivers of Health     Financial Resource Strain: Not on file   Food Insecurity: Not on file   Transportation Needs: Not on file   Physical Activity: Not on file   Stress: Not on file   Social Connections: Not on file   Intimate Partner Violence: Not on file   Housing Stability: Not on file     Outpatient Medications Marked as Taking for the 2/20/25 encounter (Annual Exam) with Twila Ramirez PA-C   Medication    etonogestrel (Nexplanon) subdermal implant     No Known Allergies        Objective:  /70 (BP Location: Left arm, Patient Position: Sitting, Cuff Size: Standard)   Ht 5' 3\" (1.6 m)   Wt 68.9 kg (152 lb)   BMI 26.93 kg/m²        Chaperone present? Offered, declines.      Physical Exam  Constitutional:       Appearance: Normal appearance. She is well-developed.   Genitourinary:      Vulva and bladder normal.      No lesions in the vagina.      Right Labia: No rash, tenderness, lesions or skin changes.     Left Labia: No tenderness, lesions, skin changes or rash.     No labial fusion noted.      No inguinal adenopathy present in the right or left side.     No vaginal discharge, erythema, tenderness or bleeding.        Right Adnexa: not tender, not full and no mass present.     Left Adnexa: not tender, not full and no mass present.     No cervical motion tenderness, discharge or lesion.      Uterus is not enlarged, tender or irregular.      No uterine mass detected.     No urethral prolapse, tenderness or mass " present.      Bladder is not tender.    Breasts:     Breasts are symmetrical.      Right: No swelling, bleeding, inverted nipple, mass, nipple discharge, skin change or tenderness.      Left: No swelling, bleeding, inverted nipple, mass, nipple discharge, skin change or tenderness.   HENT:      Head: Normocephalic and atraumatic.   Neck:      Thyroid: No thyromegaly.   Cardiovascular:      Rate and Rhythm: Normal rate and regular rhythm.      Heart sounds: Normal heart sounds. No murmur heard.     No friction rub. No gallop.   Pulmonary:      Effort: Pulmonary effort is normal. No respiratory distress.      Breath sounds: Normal breath sounds. No wheezing.   Abdominal:      General: There is no distension.      Palpations: Abdomen is soft. There is no mass.      Tenderness: There is no abdominal tenderness. There is no guarding or rebound.      Hernia: No hernia is present.   Lymphadenopathy:      Cervical: No cervical adenopathy.      Upper Body:      Right upper body: No supraclavicular, axillary or pectoral adenopathy.      Left upper body: No supraclavicular, axillary or pectoral adenopathy.      Lower Body: No right inguinal adenopathy. No left inguinal adenopathy.   Neurological:      Mental Status: She is alert and oriented to person, place, and time.   Skin:     General: Skin is warm and dry.      Comments: Nexplanon in appropriate position in left arm.    Psychiatric:         Behavior: Behavior normal.             Twila Ramirez PA-C  2/20/2025 11:16 PM

## 2025-02-28 ENCOUNTER — RESULTS FOLLOW-UP (OUTPATIENT)
Dept: OBGYN CLINIC | Facility: CLINIC | Age: 25
End: 2025-02-28

## 2025-02-28 LAB
C TRACH RRNA CVX QL NAA+PROBE: NEGATIVE
CYTOLOGIST CVX/VAG CYTO: NORMAL
DX ICD CODE: NORMAL
Lab: NORMAL
N GONORRHOEA RRNA CVX QL NAA+PROBE: NEGATIVE
OTHER STN SPEC: NORMAL
OTHER STN SPEC: NORMAL
PATH REPORT.FINAL DX SPEC: NORMAL
SL AMB NOTE:: NORMAL
SL AMB SPECIMEN ADEQUACY: NORMAL
SL AMB TEST METHODOLOGY: NORMAL

## 2025-03-22 PROBLEM — Z01.419 ROUTINE GYNECOLOGICAL EXAMINATION: Status: RESOLVED | Noted: 2025-02-20 | Resolved: 2025-03-22

## 2025-06-04 ENCOUNTER — PROCEDURE VISIT (OUTPATIENT)
Dept: OBGYN CLINIC | Facility: CLINIC | Age: 25
End: 2025-06-04
Payer: COMMERCIAL

## 2025-06-04 VITALS
SYSTOLIC BLOOD PRESSURE: 110 MMHG | WEIGHT: 152.6 LBS | HEIGHT: 63 IN | DIASTOLIC BLOOD PRESSURE: 74 MMHG | BODY MASS INDEX: 27.04 KG/M2

## 2025-06-04 DIAGNOSIS — Z30.46 NEXPLANON REMOVAL: Primary | ICD-10-CM

## 2025-06-04 DIAGNOSIS — Z30.41 SURVEILLANCE OF CONTRACEPTIVE PILL: ICD-10-CM

## 2025-06-04 PROCEDURE — 11982 REMOVE DRUG IMPLANT DEVICE: CPT | Performed by: PHYSICIAN ASSISTANT

## 2025-06-04 RX ORDER — NORETHINDRONE ACETATE AND ETHINYL ESTRADIOL 1MG-20(21)
1 KIT ORAL DAILY
Qty: 84 TABLET | Refills: 3 | Status: SHIPPED | OUTPATIENT
Start: 2025-06-04

## 2025-06-04 NOTE — ASSESSMENT & PLAN NOTE
Reviewed risks of damage to nerves, blood vessels during  removal.   Nexplanon removed without difficulty. Patient tolerated procedure well.   Avoiding lifting with left arm or getting wet for 24 hours. Leave bandage on for 24 hours and steri-strips for 1 week.   Call office if having any issues.    Patient would like to start OCP. Reviewed when to start, what to do if misses pill.  Recommend using condoms for the 1st month on the pill, if misses more than 2 pills in the pack, if on antibiotics and for STD prevention.  Reviewed common side effects of the pill including nausea, vomiting, breast pain, bloating, fatigue, mood swings, weight gain, and increased acne.  Reassured side effects typically diminish in the first month or two on the pill. Reviewed clotting risk and signs and symptoms of pulmonary embolism, DVT, myocardial infarction, stroke.  Script for Loestrin Fe 1/20 sent to pharmacy. Return to office for annual exam.

## 2025-06-04 NOTE — PROGRESS NOTES
Name: Haritha Guerrier      : 2000      MRN: [id number]  Encounter Provider: Twila Ramirez PA-C  Encounter Date: 2025   Encounter department: [facility]  :  Assessment & Plan  Nexplanon removal  Reviewed risks of damage to nerves, blood vessels during  removal.   Nexplanon removed without difficulty. Patient tolerated procedure well.   Avoiding lifting with left arm or getting wet for 24 hours. Leave bandage on for 24 hours and steri-strips for 1 week.   Call office if having any issues.    Patient would like to start OCP. Reviewed when to start, what to do if misses pill.  Recommend using condoms for the 1st month on the pill, if misses more than 2 pills in the pack, if on antibiotics and for STD prevention.  Reviewed common side effects of the pill including nausea, vomiting, breast pain, bloating, fatigue, mood swings, weight gain, and increased acne.  Reassured side effects typically diminish in the first month or two on the pill. Reviewed clotting risk and signs and symptoms of pulmonary embolism, DVT, myocardial infarction, stroke.  Script for Loestrin  sent to pharmacy. Return to office for annual exam.          Surveillance of contraceptive pill    Orders:    norethindrone-ethinyl estradiol (Junel FE 1/20) 1-20 MG-MCG per tablet; Take 1 tablet by mouth daily        History of Present Illness   HPI  Haritha Guerrier is a 24 y.o. female who presents Nexplanon removal. Nexplanon inserted 1/3/2024. Reports had irregular bleeding last summer, went on Loestrin  for a month which helped. Menses were then regular for a few months. Has now had spotting since end of 2025 that shows no signs of stopping. Would like removed and to start OCP.         Review of Systems   Constitutional:  Negative for fatigue, fever and unexpected weight change.   HENT:  Negative for dental problem and sinus pressure.    Eyes:  Negative for visual disturbance.  "  Respiratory:  Negative for cough, shortness of breath and wheezing.    Cardiovascular:  Negative for chest pain.   Gastrointestinal:  Negative for abdominal pain, blood in stool, constipation, diarrhea, nausea and vomiting.   Endocrine: Negative for polydipsia.   Genitourinary:  Positive for menstrual problem and vaginal bleeding. Negative for difficulty urinating, dyspareunia, dysuria, frequency, hematuria, pelvic pain and urgency.   Musculoskeletal:  Negative for arthralgias and back pain.   Neurological:  Negative for dizziness, seizures, light-headedness and headaches.   Psychiatric/Behavioral:  Negative for suicidal ideas. The patient is not nervous/anxious.           Objective   /74 (BP Location: Left arm, Patient Position: Sitting, Cuff Size: Standard)   Ht 5' 3\" (1.6 m)   Wt 69.2 kg (152 lb 9.6 oz)   Breastfeeding No   BMI 27.03 kg/m²      Physical Exam  Pulmonary:      Effort: Pulmonary effort is normal.     Musculoskeletal:      Cervical back: Normal range of motion and neck supple.     Skin:     General: Skin is warm and dry.         Remove and insert drug implant    Date/Time: 6/4/2025 3:00 PM    Performed by: Twila Ramirez PA-C  Authorized by: Kaya Ellison DO    Consent:       Consent obtained:  Verbal      Consent given by:  Patient      Procedural risks discussed:  Bleeding, possible continued pain and infection      Patient questions answered:  yes       Patient agrees, verbalizes understanding, and wants to proceed:  yes    Universal Protocol:       Patient states understanding of procedure being performed:  yes       Relevant documents present and verified:  yes       Site marked:  yes    Indication:       Indication:  presence of non-biodegradable drug delivery implant    Pre-procedure:       Pre-procedure timeout performed:  yes       Prepped with:  povidone-iodine       Local anesthetic:  Xylocaine with epinephrine      The site was cleaned and prepped in a sterile fashion:  " yes    Procedure:       Procedure:  Removal      Left/right:  Left      The patient was position surpine with their arm externally rotated and flexed at the elbow with their hand behind their head. The insertion site was chosen 8-10 cm medial to the medical epicondyle, and 3-5 cm posterior the sulcus between the bicep and tricep.:  Yes       Small stab incision was made in arm:  yes       The tip of the implant emerged from the skin incision:  Yes       Adherent tissue was removed with blunt dissection:  Yes       A tissue sheath was encountered and gently incised :  Yes       The implant was grasped with forceps:  Yes       The implant was removed  :  Yes       Site was closed and/or dressed with:  Steri-strips, gauze and ace bandage      Patient tolerated procedure well:  Patient tolerated procedure well